# Patient Record
Sex: FEMALE | Race: WHITE | NOT HISPANIC OR LATINO
[De-identification: names, ages, dates, MRNs, and addresses within clinical notes are randomized per-mention and may not be internally consistent; named-entity substitution may affect disease eponyms.]

---

## 2017-02-28 ENCOUNTER — APPOINTMENT (OUTPATIENT)
Dept: OBGYN | Facility: CLINIC | Age: 62
End: 2017-02-28

## 2017-06-14 ENCOUNTER — APPOINTMENT (OUTPATIENT)
Dept: OBGYN | Facility: CLINIC | Age: 62
End: 2017-06-14

## 2017-06-14 VITALS
DIASTOLIC BLOOD PRESSURE: 80 MMHG | WEIGHT: 153.38 LBS | SYSTOLIC BLOOD PRESSURE: 140 MMHG | HEIGHT: 66 IN | BODY MASS INDEX: 24.65 KG/M2

## 2017-06-14 DIAGNOSIS — Z82.49 FAMILY HISTORY OF ISCHEMIC HEART DISEASE AND OTHER DISEASES OF THE CIRCULATORY SYSTEM: ICD-10-CM

## 2017-06-14 DIAGNOSIS — Z83.3 FAMILY HISTORY OF DIABETES MELLITUS: ICD-10-CM

## 2017-06-14 DIAGNOSIS — Z83.49 FAMILY HISTORY OF OTHER ENDOCRINE, NUTRITIONAL AND METABOLIC DISEASES: ICD-10-CM

## 2017-06-14 DIAGNOSIS — Z85.3 PERSONAL HISTORY OF MALIGNANT NEOPLASM OF BREAST: ICD-10-CM

## 2017-06-20 LAB — PAP TEST: NORMAL

## 2017-07-27 ENCOUNTER — LABORATORY RESULT (OUTPATIENT)
Age: 62
End: 2017-07-27

## 2017-07-27 ENCOUNTER — APPOINTMENT (OUTPATIENT)
Dept: INFECTIOUS DISEASE | Facility: CLINIC | Age: 62
End: 2017-07-27
Payer: COMMERCIAL

## 2017-07-27 VITALS
OXYGEN SATURATION: 98 % | TEMPERATURE: 97.5 F | RESPIRATION RATE: 16 BRPM | SYSTOLIC BLOOD PRESSURE: 150 MMHG | WEIGHT: 156 LBS | HEIGHT: 66 IN | DIASTOLIC BLOOD PRESSURE: 94 MMHG | BODY MASS INDEX: 25.07 KG/M2 | HEART RATE: 81 BPM
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
## 2017-07-27 DIAGNOSIS — A69.20 LYME DISEASE, UNSPECIFIED: ICD-10-CM

## 2017-07-27 PROCEDURE — 99244 OFF/OP CNSLTJ NEW/EST MOD 40: CPT

## 2017-07-28 LAB
B BURGDOR IGG+IGM SER QL IB: NORMAL
FOLATE SERPL-MCNC: 15 NG/ML
INR PPP: 0.97 RATIO
PT BLD: 11 SEC
VIT B12 SERPL-MCNC: 643 PG/ML

## 2017-07-31 ENCOUNTER — CLINICAL ADVICE (OUTPATIENT)
Age: 62
End: 2017-07-31

## 2017-07-31 LAB
B BURGDOR AB SER-IMP: NEGATIVE
B BURGDOR IGM PATRN SER IB-IMP: NEGATIVE
B BURGDOR18/20KD IGM SER QL IB: NORMAL
B BURGDOR18KD IGG SER QL IB: PRESENT
B BURGDOR23KD IGG SER QL IB: NORMAL
B BURGDOR23KD IGM SER QL IB: NORMAL
B BURGDOR28KD AB SER QL IB: NORMAL
B BURGDOR28KD IGG SER QL IB: NORMAL
B BURGDOR30KD AB SER QL IB: NORMAL
B BURGDOR30KD IGG SER QL IB: NORMAL
B BURGDOR31KD IGG SER QL IB: NORMAL
B BURGDOR31KD IGM SER QL IB: NORMAL
B BURGDOR39KD IGG SER QL IB: NORMAL
B BURGDOR39KD IGM SER QL IB: NORMAL
B BURGDOR41KD IGG SER QL IB: PRESENT
B BURGDOR41KD IGM SER QL IB: NORMAL
B BURGDOR45KD AB SER QL IB: NORMAL
B BURGDOR45KD IGG SER QL IB: NORMAL
B BURGDOR58KD AB SER QL IB: NORMAL
B BURGDOR58KD IGG SER QL IB: NORMAL
B BURGDOR66KD IGG SER QL IB: NORMAL
B BURGDOR66KD IGM SER QL IB: NORMAL
B BURGDOR93KD IGG SER QL IB: NORMAL
B BURGDOR93KD IGM SER QL IB: NORMAL

## 2017-08-08 LAB
A PHAGOCYTOPH IGG TITR SER IF: NORMAL
ANAPLASMA PHAGOCYTOPHILIA IGG ANTIBODIES: NORMAL
ANAPLASMA PHAGOCYTOPHILIA IGM ANTIBODIES: NORMAL
EHRLICHIA AB SER QL IA: NORMAL
EHRLICIA CHAFFEENIS IGG ANTIBODIES: NORMAL
EHRLICIA CHAFFEENIS IGM ANTIBODIES: NORMAL

## 2018-03-12 ENCOUNTER — RX RENEWAL (OUTPATIENT)
Age: 63
End: 2018-03-12

## 2018-04-30 ENCOUNTER — RX RENEWAL (OUTPATIENT)
Age: 63
End: 2018-04-30

## 2018-07-02 ENCOUNTER — RX RENEWAL (OUTPATIENT)
Age: 63
End: 2018-07-02

## 2018-08-22 ENCOUNTER — RX RENEWAL (OUTPATIENT)
Age: 63
End: 2018-08-22

## 2018-08-30 ENCOUNTER — APPOINTMENT (OUTPATIENT)
Dept: OBGYN | Facility: CLINIC | Age: 63
End: 2018-08-30
Payer: COMMERCIAL

## 2018-08-30 VITALS
WEIGHT: 154.25 LBS | DIASTOLIC BLOOD PRESSURE: 90 MMHG | HEIGHT: 66 IN | BODY MASS INDEX: 24.79 KG/M2 | SYSTOLIC BLOOD PRESSURE: 150 MMHG

## 2018-08-30 PROCEDURE — 99396 PREV VISIT EST AGE 40-64: CPT

## 2018-09-05 LAB — PAP TEST: NORMAL

## 2018-11-18 ENCOUNTER — APPOINTMENT (OUTPATIENT)
Dept: CT IMAGING | Facility: IMAGING CENTER | Age: 63
End: 2018-11-18
Payer: COMMERCIAL

## 2018-11-18 ENCOUNTER — OUTPATIENT (OUTPATIENT)
Dept: OUTPATIENT SERVICES | Facility: HOSPITAL | Age: 63
LOS: 1 days | End: 2018-11-18
Payer: COMMERCIAL

## 2018-11-18 DIAGNOSIS — Z00.8 ENCOUNTER FOR OTHER GENERAL EXAMINATION: ICD-10-CM

## 2018-11-18 PROCEDURE — 74261 CT COLONOGRAPHY DX: CPT

## 2018-11-18 PROCEDURE — 74261 CT COLONOGRAPHY DX: CPT | Mod: 26

## 2019-01-12 ENCOUNTER — RX RENEWAL (OUTPATIENT)
Age: 64
End: 2019-01-12

## 2019-02-11 ENCOUNTER — RX RENEWAL (OUTPATIENT)
Age: 64
End: 2019-02-11

## 2019-03-11 ENCOUNTER — RX RENEWAL (OUTPATIENT)
Age: 64
End: 2019-03-11

## 2019-04-24 ENCOUNTER — RX RENEWAL (OUTPATIENT)
Age: 64
End: 2019-04-24

## 2019-06-03 ENCOUNTER — RX RENEWAL (OUTPATIENT)
Age: 64
End: 2019-06-03

## 2019-07-11 ENCOUNTER — RX RENEWAL (OUTPATIENT)
Age: 64
End: 2019-07-11

## 2019-08-27 ENCOUNTER — APPOINTMENT (OUTPATIENT)
Dept: OBGYN | Facility: CLINIC | Age: 64
End: 2019-08-27
Payer: MEDICARE

## 2019-08-27 VITALS — BODY MASS INDEX: 25.66 KG/M2 | WEIGHT: 159 LBS | SYSTOLIC BLOOD PRESSURE: 130 MMHG | DIASTOLIC BLOOD PRESSURE: 80 MMHG

## 2019-08-27 PROCEDURE — G0101: CPT

## 2019-08-27 NOTE — PHYSICAL EXAM
[Awake] : awake [Alert] : alert [Mass] : no breast mass [Acute Distress] : no acute distress [Nipple Discharge] : no nipple discharge [Axillary LAD] : no axillary lymphadenopathy [Soft] : soft [Tender] : non tender [Oriented x3] : oriented to person, place, and time [Normal] : cervix [No Bleeding] : there was no active vaginal bleeding [Uterine Adnexae] : were not tender and not enlarged

## 2019-08-27 NOTE — HISTORY OF PRESENT ILLNESS
[Post-Menopause, No Sxs] : post-menopausal, currently without symptoms [1 Year Ago] : 1 year ago [Good] : being in good health [Healthy Diet] : a healthy diet [Weight Concerns] : no concerns with her weight [Regular Exercise] : regular exercise [Menstrual Problems] : reports normal menses [Up to Date] : up to date with ~his/her~ STD screening

## 2019-09-08 LAB
CYTOLOGY CVX/VAG DOC THIN PREP: NORMAL
HPV HIGH+LOW RISK DNA PNL CVX: NOT DETECTED

## 2019-10-02 ENCOUNTER — RX RENEWAL (OUTPATIENT)
Age: 64
End: 2019-10-02

## 2019-11-30 ENCOUNTER — RX RENEWAL (OUTPATIENT)
Age: 64
End: 2019-11-30

## 2019-12-19 ENCOUNTER — RX RENEWAL (OUTPATIENT)
Age: 64
End: 2019-12-19

## 2020-03-05 ENCOUNTER — RX RENEWAL (OUTPATIENT)
Age: 65
End: 2020-03-05

## 2020-04-20 ENCOUNTER — RX RENEWAL (OUTPATIENT)
Age: 65
End: 2020-04-20

## 2020-05-21 ENCOUNTER — RX RENEWAL (OUTPATIENT)
Age: 65
End: 2020-05-21

## 2020-07-09 ENCOUNTER — RX RENEWAL (OUTPATIENT)
Age: 65
End: 2020-07-09

## 2020-09-21 ENCOUNTER — RX RENEWAL (OUTPATIENT)
Age: 65
End: 2020-09-21

## 2020-12-21 ENCOUNTER — RX RENEWAL (OUTPATIENT)
Age: 65
End: 2020-12-21

## 2021-03-01 ENCOUNTER — RX RENEWAL (OUTPATIENT)
Age: 66
End: 2021-03-01

## 2021-03-16 ENCOUNTER — TRANSCRIPTION ENCOUNTER (OUTPATIENT)
Age: 66
End: 2021-03-16

## 2021-03-16 ENCOUNTER — APPOINTMENT (OUTPATIENT)
Dept: OBGYN | Facility: CLINIC | Age: 66
End: 2021-03-16
Payer: MEDICARE

## 2021-03-16 VITALS — SYSTOLIC BLOOD PRESSURE: 140 MMHG | DIASTOLIC BLOOD PRESSURE: 80 MMHG | WEIGHT: 160 LBS | BODY MASS INDEX: 25.82 KG/M2

## 2021-03-16 PROCEDURE — 99213 OFFICE O/P EST LOW 20 MIN: CPT | Mod: 25

## 2021-03-16 PROCEDURE — 36415 COLL VENOUS BLD VENIPUNCTURE: CPT

## 2021-03-16 NOTE — HISTORY OF PRESENT ILLNESS
[Currently In Menopause] : currently in menopause [Post-Menopause, No Sxs] : post-menopausal, currently without symptoms [Men] : men [Yes] : Yes [No] : No [Patient would like to be screened for STIs] : Patient would like to be screened for STIs [Mammogramdate] : 9/2020 [BreastSonogramDate] : 9/2020 [FreeTextEntry1] : Pain during intercourse

## 2021-03-16 NOTE — PLAN
[FreeTextEntry1] : 1. atrophic vaginitis - offered to change to estrace cream which patient declines. will continue with vagifem 2x weekly. advised coconut oil and lubricant\par 2. sti screening - routine labs collected\par 3. hx breast cancer - orders placed for breast mri per radiology\par 4. hx hpv - discussed ASCCP  guidelines with patient. she declines a history of CIN2 or greater or excisional procedures. she would like to continue with paps 2 years. advised to rto august per medicare billing guidelines. all questions answered

## 2021-03-17 LAB
HCV AB SER QL: NONREACTIVE
HCV S/CO RATIO: 0.08 S/CO
HIV1+2 AB SPEC QL IA.RAPID: NONREACTIVE
T PALLIDUM AB SER QL IA: NEGATIVE

## 2021-03-18 ENCOUNTER — TRANSCRIPTION ENCOUNTER (OUTPATIENT)
Age: 66
End: 2021-03-18

## 2021-03-18 LAB
C TRACH RRNA SPEC QL NAA+PROBE: NOT DETECTED
N GONORRHOEA RRNA SPEC QL NAA+PROBE: NOT DETECTED
SOURCE AMPLIFICATION: NORMAL

## 2021-03-22 LAB — CYTOLOGY CVX/VAG DOC THIN PREP: NORMAL

## 2021-04-01 ENCOUNTER — APPOINTMENT (OUTPATIENT)
Dept: OBGYN | Facility: CLINIC | Age: 66
End: 2021-04-01

## 2021-04-19 ENCOUNTER — APPOINTMENT (OUTPATIENT)
Dept: OBGYN | Facility: CLINIC | Age: 66
End: 2021-04-19
Payer: MEDICARE

## 2021-04-19 VITALS — DIASTOLIC BLOOD PRESSURE: 80 MMHG | SYSTOLIC BLOOD PRESSURE: 110 MMHG

## 2021-04-19 DIAGNOSIS — N95.2 POSTMENOPAUSAL ATROPHIC VAGINITIS: ICD-10-CM

## 2021-04-19 DIAGNOSIS — Z11.3 ENCOUNTER FOR SCREENING FOR INFECTIONS WITH A PREDOMINANTLY SEXUAL MODE OF TRANSMISSION: ICD-10-CM

## 2021-04-19 PROCEDURE — 99213 OFFICE O/P EST LOW 20 MIN: CPT

## 2021-04-19 NOTE — PHYSICAL EXAM
[Vulvar Atrophy] : vulvar atrophy [Labia Majora] : normal [Labia Minora] : normal [Normal] : normal [Uterine Adnexae] : normal [FreeTextEntry1] : well healing fissure

## 2021-04-19 NOTE — PLAN
[FreeTextEntry1] : 1. atrophic vaginitis: will switch to estrace cream and apply to exterior as well. pt also using replens\par 2. STI screening: gc/ct collected from oropharynx

## 2021-04-19 NOTE — HISTORY OF PRESENT ILLNESS
[postmenopausal] : postmenopausal [Post-Menopause, No Sxs] : post-menopausal, currently without symptoms [FreeTextEntry1] : 66 yo pt presents for vulvar fissure after intercourse which she noticed immediately after. Hasnt been sexually active in 10 years, has been using Vagifem for many yearswithout issue.\par \par Also reports sore throat 2 days after oral sex. Was dx and treated for strep throat by her PCP but would like to be tested for STIs as well.

## 2021-05-03 ENCOUNTER — RX RENEWAL (OUTPATIENT)
Age: 66
End: 2021-05-03

## 2021-07-28 ENCOUNTER — APPOINTMENT (OUTPATIENT)
Dept: OBGYN | Facility: CLINIC | Age: 66
End: 2021-07-28
Payer: MEDICARE

## 2021-07-28 VITALS — BODY MASS INDEX: 26.15 KG/M2 | SYSTOLIC BLOOD PRESSURE: 130 MMHG | DIASTOLIC BLOOD PRESSURE: 80 MMHG | WEIGHT: 162 LBS

## 2021-07-28 DIAGNOSIS — N90.89 OTHER SPECIFIED NONINFLAMMATORY DISORDERS OF VULVA AND PERINEUM: ICD-10-CM

## 2021-07-28 PROCEDURE — 99213 OFFICE O/P EST LOW 20 MIN: CPT

## 2021-07-28 NOTE — PHYSICAL EXAM
[No Lesions] : no lesions  [Labia Majora] : normal [No Bleeding] : There was no active vaginal bleeding [Normal] : normal [FreeTextEntry1] : vulvar fissure 1cm left

## 2021-07-28 NOTE — HISTORY OF PRESENT ILLNESS
[FreeTextEntry1] : 65 yo pt presents for vulvar fissure again after intercourse , has been using Vagifem for many years without issue.\par \par

## 2021-07-29 LAB
HCV AB SER QL: NONREACTIVE
HCV S/CO RATIO: 0.11 S/CO

## 2021-08-09 LAB
C TRACH RRNA SPEC QL NAA+PROBE: NOT DETECTED
HIV1+2 AB SPEC QL IA.RAPID: NONREACTIVE
HSV 1+2 IGG SER IA-IMP: NEGATIVE
HSV 1+2 IGG SER IA-IMP: POSITIVE
HSV1 IGG SER QL: 53.1 INDEX
HSV1 IGM SER QL: NEGATIVE
HSV2 AB FLD-ACNC: NEGATIVE
HSV2 IGG SER QL: 0.21 INDEX
N GONORRHOEA RRNA SPEC QL NAA+PROBE: NOT DETECTED
SOURCE AMPLIFICATION: NORMAL
T PALLIDUM AB SER QL IA: NEGATIVE

## 2021-09-14 ENCOUNTER — APPOINTMENT (OUTPATIENT)
Dept: OBGYN | Facility: CLINIC | Age: 66
End: 2021-09-14
Payer: MEDICARE

## 2021-09-14 VITALS — WEIGHT: 166 LBS | SYSTOLIC BLOOD PRESSURE: 130 MMHG | DIASTOLIC BLOOD PRESSURE: 80 MMHG | BODY MASS INDEX: 26.79 KG/M2

## 2021-09-14 PROCEDURE — G0101: CPT

## 2021-09-14 NOTE — HISTORY OF PRESENT ILLNESS
[Patient reported mammogram was normal] : Patient reported mammogram was normal [Patient reported PAP Smear was normal] : Patient reported PAP Smear was normal [Currently In Menopause] : currently in menopause [Post-Menopause, No Sxs] : post-menopausal, currently without symptoms [Currently Active] : currently active [Men] : men [No] : No [Mammogramdate] : 8/13/2018 [PapSmeardate] : 8/27/2019 [TextBox_31] : HPV normal

## 2021-09-18 LAB — CYTOLOGY CVX/VAG DOC THIN PREP: NORMAL

## 2022-04-27 ENCOUNTER — OUTPATIENT (OUTPATIENT)
Dept: OUTPATIENT SERVICES | Facility: HOSPITAL | Age: 67
LOS: 1 days | End: 2022-04-27

## 2022-04-27 ENCOUNTER — APPOINTMENT (OUTPATIENT)
Dept: DISASTER EMERGENCY | Facility: HOSPITAL | Age: 67
End: 2022-04-27

## 2022-04-27 VITALS
OXYGEN SATURATION: 96 % | RESPIRATION RATE: 17 BRPM | WEIGHT: 152.34 LBS | HEIGHT: 65 IN | DIASTOLIC BLOOD PRESSURE: 77 MMHG | HEART RATE: 79 BPM | TEMPERATURE: 99 F | SYSTOLIC BLOOD PRESSURE: 130 MMHG

## 2022-04-27 VITALS
HEART RATE: 70 BPM | RESPIRATION RATE: 17 BRPM | SYSTOLIC BLOOD PRESSURE: 132 MMHG | DIASTOLIC BLOOD PRESSURE: 79 MMHG | TEMPERATURE: 99 F | OXYGEN SATURATION: 95 %

## 2022-04-27 DIAGNOSIS — U07.1 COVID-19: ICD-10-CM

## 2022-04-27 RX ORDER — BEBTELOVIMAB 87.5 MG/ML
175 INJECTION, SOLUTION INTRAVENOUS ONCE
Refills: 0 | Status: COMPLETED | OUTPATIENT
Start: 2022-04-27 | End: 2022-04-27

## 2022-04-27 RX ADMIN — BEBTELOVIMAB 175 MILLIGRAM(S): 87.5 INJECTION, SOLUTION INTRAVENOUS at 16:32

## 2022-04-27 NOTE — MONOCLONAL ANTIBODY INFUSION - ASSESSMENT AND PLAN
ASSESSMENT:  Pt is a 66y year old Female COVID positive and symptomatic who was referred for a single injection of Bebtelovimab monoclonal antibody treatment.    Symptoms: fever, cough, HA, SOB  Risk Profile: age > 65, Breast CA  Vaccination Status: pfizer x2, boosted x 2    PLAN:  - Injection procedure explained to patient   - Consent for monoclonal antibody infusion obtained   - Risk & benefits discussed/all questions answered  - Injection of Bebtelovimab  - Will observe patient for one hour post injection and then discharge home with outpatient follow up as planned by PMD.    POST INFUSION ASSESSMENT:   DISCHARGE at approximately  1720  hours    - Patient tolerated injection - well denies complaints of chest pain, SOB, dizziness or palpitations  - VSS for discharge home  - D/C instructions given/ fact sheet included.  - Patient to follow-up with PCP as needed.

## 2022-04-27 NOTE — MONOCLONAL ANTIBODY INFUSION - EXAM
CC: Monoclonal Antibody Infusion/COVID 19 Positive  66yFemale with a PMHx of Breast CA, HTN testing positive for COVID presenting for MAB injection    Positive COVID test date: 4/25/22    exam/findings:  T(C): --  HR: --  BP: --  RR: --  SpO2: --      PE:   Appearance: NAD	  HEENT:   Normal oral mucosa,   Lymphatic: No lymphadenopathy  Cardiovascular: Normal S1 S2, No JVD, No murmurs, No edema  Respiratory: Lungs clear to auscultation	  Gastrointestinal:  Soft, Non-tender, + BS	  Skin: warm and dry  Neurologic: Non-focal  Extremities: Normal range of motion,                 CC: Monoclonal Antibody Infusion/COVID 19 Positive  66yFemale with a PMHx of Breast CA, HTN testing positive for COVID presenting for MAB injection    Positive COVID test date: 4/25/22    Vital Signs Last 24 Hrs  PENDING      PE:   Appearance: NAD	  HEENT:   Normal oral mucosa,   Lymphatic: No lymphadenopathy  Cardiovascular: Normal S1 S2, No JVD, No murmurs, No edema  Respiratory: Lungs clear to auscultation	  Gastrointestinal:  Soft, Non-tender, + BS	  Skin: warm and dry  Neurologic: Non-focal  Extremities: Normal range of motion,                 CC: Monoclonal Antibody Infusion/COVID 19 Positive  66yFemale with a PMHx of Breast CA, HTN testing positive for COVID presenting for MAB injection    Positive COVID test date: 4/25/22    Vital Signs Last 24 Hrs  T(C): 37.2 (27 Apr 2022 16:50), Max: 37.2 (27 Apr 2022 16:35)  T(F): 98.9 (27 Apr 2022 16:50), Max: 98.9 (27 Apr 2022 16:35)  HR: 74 (27 Apr 2022 16:50) (74 - 88)  BP: 120/78 (27 Apr 2022 16:50) (120/78 - 136/80)  BP(mean): --  RR: 17 (27 Apr 2022 16:50) (17 - 19)  SpO2: 95% (27 Apr 2022 16:50) (95% - 97%)      PE:   Appearance: NAD	  HEENT:   Normal oral mucosa,   Lymphatic: No lymphadenopathy  Cardiovascular: Normal S1 S2, No JVD, No murmurs, No edema  Respiratory: Lungs clear to auscultation	  Gastrointestinal:  Soft, Non-tender, + BS	  Skin: warm and dry  Neurologic: Non-focal  Extremities: Normal range of motion,

## 2022-12-01 ENCOUNTER — RESULT REVIEW (OUTPATIENT)
Age: 67
End: 2022-12-01

## 2023-01-02 ENCOUNTER — RX RENEWAL (OUTPATIENT)
Age: 68
End: 2023-01-02

## 2023-02-07 ENCOUNTER — APPOINTMENT (OUTPATIENT)
Dept: OBGYN | Facility: CLINIC | Age: 68
End: 2023-02-07
Payer: MEDICARE

## 2023-02-07 VITALS
BODY MASS INDEX: 26.63 KG/M2 | WEIGHT: 165 LBS | DIASTOLIC BLOOD PRESSURE: 73 MMHG | HEART RATE: 78 BPM | SYSTOLIC BLOOD PRESSURE: 128 MMHG | OXYGEN SATURATION: 96 %

## 2023-02-07 DIAGNOSIS — Z01.419 ENCOUNTER FOR GYNECOLOGICAL EXAMINATION (GENERAL) (ROUTINE) W/OUT ABNORMAL FINDINGS: ICD-10-CM

## 2023-02-07 PROCEDURE — G0101: CPT

## 2023-02-07 NOTE — HISTORY OF PRESENT ILLNESS
[Patient reported mammogram was normal] : Patient reported mammogram was normal [Patient reported PAP Smear was normal] : Patient reported PAP Smear was normal [Currently In Menopause] : currently in menopause [Post-Menopause, No Sxs] : post-menopausal, currently without symptoms [Currently Active] : currently active [Men] : men [No] : No [Patient would like to be screened for STIs] : Patient would like to be screened for STIs [FreeTextEntry1] : Clarita Narayanan 66yo presents for an annual exam.\par Patient states she is experiencing vaginal irritation associated with burning on internal left labia for six months.\par Patient also states her partner has some small pimples on his penis and she thinks it may be HPV. [Mammogramdate] : 8/8/2018 [PapSmeardate] : 9/14/2021

## 2023-02-07 NOTE — COUNSELING
[Breast Self Exam] : breast self exam [Bladder Hygiene] : bladder hygiene [STD (testing, results, tx)] : STD (testing, results, tx)

## 2023-02-09 LAB
C TRACH RRNA SPEC QL NAA+PROBE: NOT DETECTED
CANDIDA VAG CYTO: NOT DETECTED
G VAGINALIS+PREV SP MTYP VAG QL MICRO: NOT DETECTED
HBV SURFACE AG SER QL: NONREACTIVE
HCV AB SER QL: NONREACTIVE
HCV S/CO RATIO: 0.08 S/CO
HIV1+2 AB SPEC QL IA.RAPID: NONREACTIVE
HPV HIGH+LOW RISK DNA PNL CVX: NOT DETECTED
HSV 1+2 IGG SER IA-IMP: NEGATIVE
HSV 1+2 IGG SER IA-IMP: POSITIVE
HSV1 IGG SER QL: 52.8 INDEX
HSV2 IGG SER QL: 0.28 INDEX
N GONORRHOEA RRNA SPEC QL NAA+PROBE: NOT DETECTED
SOURCE AMPLIFICATION: NORMAL
T PALLIDUM AB SER QL IA: NEGATIVE
T VAGINALIS VAG QL WET PREP: NOT DETECTED

## 2023-02-13 LAB
CYTOLOGY CVX/VAG DOC THIN PREP: ABNORMAL
HHV SPEC CULT: NORMAL
HSV TYPE 1: NORMAL
HSV TYPE 2: NORMAL
HSV1 IGM SER QL: NEGATIVE
HSV2 AB FLD-ACNC: NEGATIVE

## 2023-03-13 ENCOUNTER — APPOINTMENT (OUTPATIENT)
Dept: OBGYN | Facility: CLINIC | Age: 68
End: 2023-03-13

## 2023-11-07 ENCOUNTER — RX RENEWAL (OUTPATIENT)
Age: 68
End: 2023-11-07

## 2023-11-07 RX ORDER — ESTRADIOL 10 UG/1
10 TABLET, FILM COATED VAGINAL
Qty: 24 | Refills: 1 | Status: ACTIVE | COMMUNITY
Start: 2019-08-27 | End: 1900-01-01

## 2024-01-16 RX ORDER — ESTRADIOL 10 UG/1
10 TABLET, FILM COATED VAGINAL
Qty: 25 | Refills: 1 | Status: ACTIVE | COMMUNITY
Start: 2022-03-22 | End: 1900-01-01

## 2024-02-08 RX ORDER — LIDOCAINE 5 G/100G
5 OINTMENT TOPICAL
Qty: 1 | Refills: 2 | Status: COMPLETED | COMMUNITY
Start: 2021-07-28 | End: 2024-02-08

## 2024-02-08 RX ORDER — HYDROCORTISONE 25 MG/G
2.5 OINTMENT TOPICAL 4 TIMES DAILY
Qty: 2 | Refills: 0 | Status: COMPLETED | COMMUNITY
Start: 2021-07-28 | End: 2024-02-08

## 2024-02-08 RX ORDER — ESTRADIOL 0.1 MG/G
0.1 CREAM VAGINAL
Qty: 1 | Refills: 3 | Status: COMPLETED | COMMUNITY
Start: 2021-09-14 | End: 2024-02-08

## 2024-02-08 RX ORDER — AMLODIPINE BESYLATE 5 MG/1
5 TABLET ORAL
Refills: 0 | Status: ACTIVE | COMMUNITY

## 2024-02-08 RX ORDER — ESTRADIOL 0.1 MG/G
0.1 CREAM VAGINAL
Qty: 1 | Refills: 3 | Status: COMPLETED | COMMUNITY
Start: 2021-04-19 | End: 2024-02-08

## 2024-02-13 ENCOUNTER — APPOINTMENT (OUTPATIENT)
Dept: OBGYN | Facility: CLINIC | Age: 69
End: 2024-02-13

## 2024-02-27 ENCOUNTER — APPOINTMENT (OUTPATIENT)
Dept: OBGYN | Facility: CLINIC | Age: 69
End: 2024-02-27
Payer: MEDICARE

## 2024-02-27 VITALS
WEIGHT: 163.13 LBS | BODY MASS INDEX: 26.33 KG/M2 | SYSTOLIC BLOOD PRESSURE: 130 MMHG | DIASTOLIC BLOOD PRESSURE: 70 MMHG

## 2024-02-27 DIAGNOSIS — R10.2 PELVIC AND PERINEAL PAIN: ICD-10-CM

## 2024-02-27 PROCEDURE — 56820 COLPOSCOPY VULVA: CPT

## 2024-02-27 PROCEDURE — 99214 OFFICE O/P EST MOD 30 MIN: CPT | Mod: 25

## 2024-02-27 PROCEDURE — 87210 SMEAR WET MOUNT SALINE/INK: CPT | Mod: QW

## 2024-02-27 NOTE — HISTORY OF PRESENT ILLNESS
[FreeTextEntry1] : The patient is 68 years old  0 whose last menstrual period was in  at the age of 51.  She is referred for evaluation of vulvar irritation.  In 2021, after several years of sexual inactivity, the patient was sexually active.  She developed a deep vulvar fissure (traumatic).  It took 6 months to heal despite estradiol cream and hydrocortisone cream.  2 years ago, she developed vulvar irritation on the left labium minus.  The irritation is posterior to the spot of her fissure.  She describes the irritation is constant. ***The patient was asked about all of the following, positive responses are listed below*** Gynecologic History: History of breast biopsy or breast cyst aspiration; Pain during or after intercourse; Vaginal bleeding or spotting between periods; Breast discharge; Abnormal or irregular periods; Abnormal vaginal discharge; history of Gynecologic cancer; history of Ovarian cysts; history of Fibroids; history of frequent Urinary tract infections; Urinary problems (i.e. frequency, urgency, difficulty, leaking); history of Pelvic pain/pelvic inflammatory disease; history of chronic Vaginal infections (i.e., yeast, Trichomonas, bacterial Vaginosis) Contraceptive History: What she is currently using for birth control; If she was requesting birth control today. Sexually Transmitted Disease History: A history of any of the following sexually transmitted diseases: Gonorrhea; Chlamydia; Syphilis; Herpes; HPV/Warts; Hepatitis; HIV/AIDS and whether she feels that she is at risk for HIV/AIDS and wants to be tested for HIV. Abnormal Pap Smear History: History of abnormal Pap smear; evaluation of any abnormalities; treatment of any abnormalities Date and result of her last Pap smear. SCOTT in Utero Exposure History: A history of personal or maternal SCOTT exposure. Hospitalizations (other than childbirth) Blood transfusions. Review of Systems: General: weight change, general health; Head: headaches, vertigo; Eyes: vision, diplopia; Ears: change in hearing, tinnitus; Nose: epistaxis, chronic rhinitis; Mouth: gingival bleeding; Neck: stiffness, pain, masses; Chest: dyspnea, wheezing, hemoptysis, persistent cough; Heart: chest pains, palpitations; Abdomen: liver disease, abdominal discomfort, Indigestion, Nausea/Vomiting, Constipation/Diarrhea, Blood in stool, change in bowel habits; : renal disease; Musculoskeletal: pain in muscles or joints, paresthesias or numbness; Skin: rashes, itching, pigmentation changes; Neurologic: weakness, tremor, seizures, changes in mentation; Psychiatric: depressive symptoms, changes in sleep habits. Surgery: History of gynecological surgery; History of non-gynecological surgery.   ***Pregnancy History*** # Pregnancies 0   ***Menstrual History*** Age of first period 15; # of days between cycles: Monthly; duration of period: 3-5 days; she occasionally had cramps with periods; uses nothing to relieve cramps.   ***Menopause*** Menopause at age 51; The patient was asked about all of the following, positive responses are listed below: menopausal symptoms; past use of hormonal replacement therapy. vaginal dryness, estradiol tablets 2X wkly.   ***Sexual history*** She is not sexually active; [place]: 21; Total # of lifetime partners:15; She denies having more than one current partner; Date of last sexual contact (if not currently sexually active): 2022.   ***Medical history*** Alcohol abuse; Anemia; Anxiety; Arthritis/Lupus; Asthma; Blood clots in legs; Blood disorders; Breast problems; Cancer; Colitis; Depression; Diabetes; Drug Abuse; Eating disorder; Elevated cholesterol; Epilepsy/seizures; Eye problems/glaucoma; Gall bladder disease; Gout; Head or neck radiation; Hearing problems; Heart Disease; Hemorrhoid; Hepatitis or jaundice; High blood pressure; HIV/AIDS; Kidney disease; Low back problems; Neurological disorders; Osteoporosis; Other; Pneumonia; Rheumatic fever; Skin diseases; Stroke; Thyroid disease; Ulcers Family History - Cancers of the Breast; Cervix; Uterus; Ovarian; Lung; Colon; Other Vaccination Status - Whether she had all the usual childhood vaccinations or illness (negative answers recorded); Whether she was not vaccinated for HPV (All 3 doses) Social History - Current or past cigarette smoking; current or past alcohol abuse; current or past Marijuana use; current or past Cocaine use; current or past abuse of any other illegal or prescription drugs; current employment.   ***Preventative Care*** Date of the patient's last: Pap smear: 23 Breast exam:  Mammogram:  Cholesterol check:  Sigmoidoscopy/colonoscopy: [facility]    ***Social History*** The patient was asked about all of the following; positive responses are listed below: current or past cigarette smoking; Alcohol; current or past Marijuana use; current or past Cocaine use; current or past abuse of any other illegal or prescription drugs. Employment: Retired-  [employer]   ***Significant positives*** Medications that she was taking at the time of the visit include Amlodipine and estradiol tablets. She was not taking any other medication at the time of the visit. She reports allergy to penicillins (rash, hives) and adhesives. She denied any other medication allergies at the time of the visit. Her past gynecological history is significant for ER positive breast cancer (lumpectomy), several breast biopsies in , laparoscopic BSO  and HPV+/warts . Her past gynecological history is not otherwise significant. She is not sexually active. She has a history of abnormal Pap tests in . Her last Pap test was 22 and was normal. Her past medical history is significant for hypertension, breast cancer, hemorrhoids, anxiety, hyperlipidemia, Lyme disease and hearing loss. Her past surgical history is significant for right lumpectomy , Lap BSO, LEEP or cervical laser in . Her past medical and surgical histories are not otherwise significant. Besides hospitalizations for surgeries and deliveries, she has not been hospitalized. She was not vaccinated for HPV. A 20-point review of systems was significant for occasional palpitations. Her review of systems was not otherwise significant. Her family's cancer history is significant for brother with bladder cancer.  Her family's cancer history is not otherwise significant. She does not smoke. She occasionally consumes alcohol. Home

## 2024-02-27 NOTE — PLAN
[FreeTextEntry1] : I recommended that she call for the results of her yeast culture in 1 week.  I recommended that she avoid irritants and gave her a list of irritants to avoid.  I told her to try using Vaseline to improve the vulvas barrier function.  I recommended that she use vulvar estradiol cream (a pea-sized amount) nightly.  I also recommended that she return for a follow up evaluation in one month.  If her symptoms do not resolve, consideration will be given to a therapeutic trial of ABG cream.

## 2024-02-27 NOTE — PHYSICAL EXAM
[Chaperone Present] : A chaperone was present in the examining room during all aspects of the physical examination [TextEntry] : ***General*** General Appearance: normal; Judgment and insight: normal; the patient is oriented to time, place and person; Recent and remote memory: normal; Mood and affect: normal; Respiratory effort: normal.  ***Pelvic Examination*** External genitalia: the appearance and hair distribution are normal; no lesions are appreciated. Urethra/urethral meatus: no masses or tenderness are appreciated; there is no scarring noted. Bladder: nontender; there is no fullness appreciated; no masses were palpated. Vagina: the vagina is slightly atrophic; a mucous discharge is seen; no lesions are seen; pelvic support is adequate without any evidence of cystocele or rectocele. Cervix: normal in appearance; no overt lesions are seen. Uterus: Anteverted; normal in size, mobile, nontender, and well supported. Adnexa/parametria: nontender and not enlarged; no masses are palpated. A stool specimen was not indicated at this time.   ***Vaginal Discharge Evaluation*** A saline wet mount and KOH slide evaluation of the vaginal discharge were done. The discharge was mucous; the pH was normal; the whiff test was negative; clue cells were not seen; hyphae were not seen; a moderate number of lactobacilli were seen; a few white blood cells were seen; superficial cells were seen; a candida culture was sent.   ***colposcopy of the vulva*** Colposcopy of the external genitalia showed that the labia majora and labia minora were normal. She identified the lower third of the inner left labium minus as the location of her pain/itching/irritation. There was no vestibular tenderness of the anterior minor vestibular glands, and no vestibular tenderness of the posterior minor vestibular glands. There was no evidence of other dermatopathology. There was mild erythema of the introitus at the location where the patient identified her vulvar irritation.  There was mild vulvar atrophy at the introitus.  The introitus admitted to examining fingers.

## 2024-02-27 NOTE — ASSESSMENT
[TextEntry] : The patient is complaining of vulvar pain and irritation for the past 2 years.  The irritation is very focal in the lower left inner labium minus.  No dermatopathology is seen.  There is no evidence of Candida (pending culture).  There is some vulvar atrophy.  Although the presentation is atypical for vulvodynia, this must always be considered. 47  minutes of total time was spent on the date of the encounter. This included time for review of medical records and laboratory results, face to face time (including the physical examination counseling and coordination of care), time for patient education, treatment plans, answering questions, communicating with other doctors and for medical record documentation.  The time spent is separate from time spent on separately billed procedures.

## 2024-06-18 ENCOUNTER — APPOINTMENT (OUTPATIENT)
Dept: OBGYN | Facility: CLINIC | Age: 69
End: 2024-06-18

## 2024-11-14 ENCOUNTER — RX RENEWAL (OUTPATIENT)
Age: 69
End: 2024-11-14

## 2025-07-08 RX ORDER — ESTRADIOL 10 UG/1
10 TABLET, FILM COATED VAGINAL
Qty: 24 | Refills: 3 | Status: ACTIVE | COMMUNITY
Start: 2025-07-03 | End: 1900-01-01

## 2025-08-11 ENCOUNTER — NON-APPOINTMENT (OUTPATIENT)
Age: 70
End: 2025-08-11

## 2025-08-13 ENCOUNTER — APPOINTMENT (OUTPATIENT)
Dept: OBGYN | Facility: CLINIC | Age: 70
End: 2025-08-13
Payer: MEDICARE

## 2025-08-13 ENCOUNTER — NON-APPOINTMENT (OUTPATIENT)
Age: 70
End: 2025-08-13

## 2025-08-13 VITALS
HEART RATE: 75 BPM | DIASTOLIC BLOOD PRESSURE: 81 MMHG | SYSTOLIC BLOOD PRESSURE: 120 MMHG | WEIGHT: 156 LBS | BODY MASS INDEX: 25.18 KG/M2 | OXYGEN SATURATION: 97 %

## 2025-08-13 DIAGNOSIS — Z01.419 ENCOUNTER FOR GYNECOLOGICAL EXAMINATION (GENERAL) (ROUTINE) W/OUT ABNORMAL FINDINGS: ICD-10-CM

## 2025-08-13 PROCEDURE — G0101: CPT

## 2025-08-13 RX ORDER — CLOTRIMAZOLE AND BETAMETHASONE DIPROPIONATE 10; .5 MG/G; MG/G
1-0.05 CREAM TOPICAL TWICE DAILY
Qty: 1 | Refills: 2 | Status: ACTIVE | COMMUNITY
Start: 2025-08-13 | End: 1900-01-01

## 2025-08-18 LAB — CYTOLOGY CVX/VAG DOC THIN PREP: NORMAL
